# Patient Record
Sex: FEMALE
[De-identification: names, ages, dates, MRNs, and addresses within clinical notes are randomized per-mention and may not be internally consistent; named-entity substitution may affect disease eponyms.]

---

## 2024-03-14 ENCOUNTER — APPOINTMENT (OUTPATIENT)
Dept: OBGYN | Facility: CLINIC | Age: 34
End: 2024-03-14

## 2025-02-01 ENCOUNTER — EMERGENCY (EMERGENCY)
Facility: HOSPITAL | Age: 35
LOS: 1 days | End: 2025-02-01
Payer: SELF-PAY

## 2025-02-01 VITALS
WEIGHT: 199.96 LBS | OXYGEN SATURATION: 98 % | HEIGHT: 66 IN | HEART RATE: 116 BPM | TEMPERATURE: 97 F | DIASTOLIC BLOOD PRESSURE: 80 MMHG | RESPIRATION RATE: 18 BRPM | SYSTOLIC BLOOD PRESSURE: 120 MMHG

## 2025-02-01 NOTE — ED ADULT TRIAGE NOTE - BP NONINVASIVE SYSTOLIC (MM HG)
Detail Level: Detailed Add 16545 Cpt? (Important Note: In 2017 The Use Of 66450 Is Being Tracked By Cms To Determine Future Global Period Reimbursement For Global Periods): yes Wound Evaluated By: Anastasiia [id number]